# Patient Record
Sex: FEMALE | Race: WHITE | Employment: FULL TIME | URBAN - METROPOLITAN AREA
[De-identification: names, ages, dates, MRNs, and addresses within clinical notes are randomized per-mention and may not be internally consistent; named-entity substitution may affect disease eponyms.]

---

## 2017-02-07 ENCOUNTER — HOSPITAL ENCOUNTER (OUTPATIENT)
Dept: CARDIAC CATH/INVASIVE PROCEDURES | Age: 37
Discharge: HOME | End: 2017-02-07
Attending: INTERNAL MEDICINE | Admitting: INTERNAL MEDICINE
Payer: COMMERCIAL

## 2017-02-07 VITALS
DIASTOLIC BLOOD PRESSURE: 88 MMHG | OXYGEN SATURATION: 99 % | BODY MASS INDEX: 50.02 KG/M2 | WEIGHT: 293 LBS | HEART RATE: 128 BPM | TEMPERATURE: 97.7 F | SYSTOLIC BLOOD PRESSURE: 155 MMHG | HEIGHT: 64 IN | RESPIRATION RATE: 21 BRPM

## 2017-02-07 PROCEDURE — 6370000000 HC RX 637 (ALT 250 FOR IP): Performed by: INTERNAL MEDICINE

## 2017-02-07 PROCEDURE — 93620 COMP EP EVL R AT VEN PAC&REC: CPT | Performed by: INTERNAL MEDICINE

## 2017-02-07 PROCEDURE — 93623 PRGRMD STIMJ&PACG IV RX NFS: CPT | Performed by: INTERNAL MEDICINE

## 2017-02-07 PROCEDURE — C1768 GRAFT, VASCULAR: HCPCS

## 2017-02-07 PROCEDURE — C1730 CATH, EP, 19 OR FEW ELECT: HCPCS

## 2017-02-07 PROCEDURE — C1894 INTRO/SHEATH, NON-LASER: HCPCS

## 2017-02-07 PROCEDURE — 76937 US GUIDE VASCULAR ACCESS: CPT

## 2017-02-07 PROCEDURE — 93005 ELECTROCARDIOGRAM TRACING: CPT

## 2017-02-07 RX ORDER — ACETAMINOPHEN 325 MG/1
650 TABLET ORAL EVERY 4 HOURS PRN
Status: DISCONTINUED | OUTPATIENT
Start: 2017-02-07 | End: 2017-02-10 | Stop reason: HOSPADM

## 2017-02-07 RX ORDER — NEBIVOLOL 5 MG/1
5 TABLET ORAL DAILY
Qty: 60 TABLET | Refills: 3 | Status: SHIPPED | OUTPATIENT
Start: 2017-02-07

## 2017-02-07 RX ORDER — SODIUM CHLORIDE 9 MG/ML
INJECTION, SOLUTION INTRAVENOUS CONTINUOUS
Status: DISCONTINUED | OUTPATIENT
Start: 2017-02-07 | End: 2017-02-10 | Stop reason: HOSPADM

## 2017-02-07 RX ORDER — SODIUM CHLORIDE 0.9 % (FLUSH) 0.9 %
10 SYRINGE (ML) INJECTION EVERY 12 HOURS SCHEDULED
Status: DISCONTINUED | OUTPATIENT
Start: 2017-02-07 | End: 2017-02-10 | Stop reason: HOSPADM

## 2017-02-07 RX ORDER — SODIUM CHLORIDE 0.9 % (FLUSH) 0.9 %
10 SYRINGE (ML) INJECTION PRN
Status: DISCONTINUED | OUTPATIENT
Start: 2017-02-07 | End: 2017-02-10 | Stop reason: HOSPADM

## 2017-02-07 RX ADMIN — INSULIN LISPRO 10 UNITS: 100 INJECTION, SOLUTION INTRAVENOUS; SUBCUTANEOUS at 10:30

## 2017-02-10 LAB
EKG ATRIAL RATE: 90 BPM
EKG P AXIS: 56 DEGREES
EKG P-R INTERVAL: 152 MS
EKG Q-T INTERVAL: 394 MS
EKG QRS DURATION: 94 MS
EKG QTC CALCULATION (BAZETT): 481 MS
EKG R AXIS: 33 DEGREES
EKG T AXIS: 34 DEGREES
EKG VENTRICULAR RATE: 90 BPM

## 2018-02-15 ENCOUNTER — HOSPITAL ENCOUNTER (EMERGENCY)
Age: 38
Discharge: HOME | End: 2018-02-15
Attending: EMERGENCY MEDICINE
Payer: MEDICAID

## 2018-02-15 ENCOUNTER — APPOINTMENT (OUTPATIENT)
Dept: GENERAL RADIOLOGY | Age: 38
End: 2018-02-15
Payer: MEDICAID

## 2018-02-15 VITALS
SYSTOLIC BLOOD PRESSURE: 134 MMHG | TEMPERATURE: 98 F | RESPIRATION RATE: 18 BRPM | DIASTOLIC BLOOD PRESSURE: 99 MMHG | HEART RATE: 99 BPM | OXYGEN SATURATION: 95 %

## 2018-02-15 DIAGNOSIS — S83.92XA SPRAIN OF LEFT KNEE, UNSPECIFIED LIGAMENT, INITIAL ENCOUNTER: Primary | ICD-10-CM

## 2018-02-15 PROCEDURE — 99283 EMERGENCY DEPT VISIT LOW MDM: CPT

## 2018-02-15 PROCEDURE — 73564 X-RAY EXAM KNEE 4 OR MORE: CPT

## 2018-02-15 PROCEDURE — 6370000000 HC RX 637 (ALT 250 FOR IP): Performed by: EMERGENCY MEDICINE

## 2018-02-15 RX ORDER — IBUPROFEN 800 MG/1
800 TABLET ORAL ONCE
Status: COMPLETED | OUTPATIENT
Start: 2018-02-15 | End: 2018-02-15

## 2018-02-15 RX ORDER — IBUPROFEN 800 MG/1
800 TABLET ORAL EVERY 8 HOURS PRN
Qty: 30 TABLET | Refills: 0 | Status: SHIPPED | OUTPATIENT
Start: 2018-02-15

## 2018-02-15 RX ADMIN — IBUPROFEN 800 MG: 800 TABLET, FILM COATED ORAL at 13:07

## 2018-02-15 ASSESSMENT — PAIN DESCRIPTION - PAIN TYPE
TYPE: ACUTE PAIN
TYPE: ACUTE PAIN

## 2018-02-15 ASSESSMENT — PAIN DESCRIPTION - PROGRESSION
CLINICAL_PROGRESSION: GRADUALLY WORSENING
CLINICAL_PROGRESSION: GRADUALLY IMPROVING

## 2018-02-15 ASSESSMENT — PAIN DESCRIPTION - ORIENTATION
ORIENTATION: LEFT
ORIENTATION: LEFT

## 2018-02-15 ASSESSMENT — PAIN DESCRIPTION - LOCATION
LOCATION: KNEE
LOCATION: KNEE

## 2018-02-15 ASSESSMENT — ENCOUNTER SYMPTOMS
BACK PAIN: 0
NAUSEA: 0
WHEEZING: 0
SHORTNESS OF BREATH: 0
COUGH: 0
RHINORRHEA: 0
EYE PAIN: 0
EYE DISCHARGE: 0
DIARRHEA: 0
ABDOMINAL PAIN: 0
VOMITING: 0
SORE THROAT: 0

## 2018-02-15 ASSESSMENT — PAIN SCALES - GENERAL
PAINLEVEL_OUTOF10: 7
PAINLEVEL_OUTOF10: 8

## 2018-02-15 ASSESSMENT — PAIN DESCRIPTION - ONSET
ONSET: GRADUAL
ONSET: GRADUAL

## 2018-02-15 ASSESSMENT — PAIN DESCRIPTION - DESCRIPTORS
DESCRIPTORS: ACHING
DESCRIPTORS: ACHING

## 2018-02-15 ASSESSMENT — PAIN DESCRIPTION - FREQUENCY
FREQUENCY: CONTINUOUS
FREQUENCY: INTERMITTENT

## 2018-02-15 NOTE — ED NOTES
Pt presents to ED due to left knee pain. Pt states she was playing with her children on the floor and felt her left knee pop. Pt states the pain is a 8/10. Pt has retsed and elevated let knee and has been taking ibuprofen. There is minimal swelling in left knee. Vitals noted. Will continue to monitor.       Abdulaziz Villalobos RN  02/15/18 [id number]

## 2018-02-15 NOTE — ED PROVIDER NOTES
[facility]  eMERGENCY dEPARTMENT eNCOUnter          CHIEF COMPLAINT       Chief Complaint   Patient presents with    Knee Pain     left       Nurses Notes reviewed and I agree except as noted in the HPI. HISTORY OF PRESENT ILLNESS    Chetan Feliz is a 40 y.o. female who presents to the Emergency Department for the evaluation of left knee pain. The patient reports that she was wrestling with her son at approximately 8:30 AM this morning and she felt a pop in her left knee. Since this occurred, she has been unable to put much pressure on her left leg and is experiencing pain with flexion. She rates her pain as a 8/10 in severity. Patient denies any other pain or injury. Patient denies any fever, chills, nausea, vomiting, chest pain, or shortness of breath. The HPI was provided by the patient. REVIEW OF SYSTEMS     Review of Systems   Constitutional: Negative for appetite change, chills, fatigue and fever. HENT: Negative for congestion, ear pain, rhinorrhea and sore throat. Eyes: Negative for pain, discharge and visual disturbance. Respiratory: Negative for cough, shortness of breath and wheezing. Cardiovascular: Negative for chest pain, palpitations and leg swelling. Gastrointestinal: Negative for abdominal pain, diarrhea, nausea and vomiting. Genitourinary: Negative for difficulty urinating, dysuria and vaginal discharge. Musculoskeletal: Positive for arthralgias (left knee pain). Negative for back pain, joint swelling and neck pain. Skin: Negative for pallor and rash. Neurological: Negative for dizziness, syncope, weakness, light-headedness and headaches. Hematological: Negative for adenopathy. Psychiatric/Behavioral: Negative for confusion, dysphoric mood and suicidal ideas. The patient is not nervous/anxious. PAST MEDICAL HISTORY    has a past medical history of Diabetes mellitus ([facility] [address]  Hypertension; and SVT (supraventricular tachycardia)

## 2020-11-11 ENCOUNTER — HOSPITAL ENCOUNTER (OUTPATIENT)
Age: 40
Setting detail: SPECIMEN
Discharge: HOME | End: 2020-11-11
Payer: COMMERCIAL

## 2020-11-12 LAB
ALBUMIN SERPL-MCNC: 3.8 G/DL (ref 3.5–5.2)
ALBUMIN/GLOBULIN RATIO: 1.4 (ref 1–2.5)
ALP BLD-CCNC: 112 U/L (ref 35–104)
ALT SERPL-CCNC: 27 U/L (ref 5–33)
ANION GAP SERPL CALCULATED.3IONS-SCNC: 10 MMOL/L (ref 9–17)
AST SERPL-CCNC: 22 U/L
BILIRUB SERPL-MCNC: 0.33 MG/DL (ref 0.3–1.2)
BUN BLDV-MCNC: 10 MG/DL (ref 6–20)
BUN/CREAT BLD: ABNORMAL (ref 9–20)
CALCIUM SERPL-MCNC: 8.9 MG/DL (ref 8.6–10.4)
CHLORIDE BLD-SCNC: 101 MMOL/L (ref 98–107)
CHOLESTEROL/HDL RATIO: 10.5
CHOLESTEROL: 209 MG/DL
CO2: 25 MMOL/L (ref 20–31)
CREAT SERPL-MCNC: 0.6 MG/DL (ref 0.5–0.9)
GFR AFRICAN AMERICAN: >60 ML/MIN
GFR NON-AFRICAN AMERICAN: >60 ML/MIN
GFR SERPL CREATININE-BSD FRML MDRD: ABNORMAL ML/MIN/{1.73_M2}
GFR SERPL CREATININE-BSD FRML MDRD: ABNORMAL ML/MIN/{1.73_M2}
GLUCOSE BLD-MCNC: 395 MG/DL (ref 70–99)
HDLC SERPL-MCNC: 20 MG/DL
LDL CHOLESTEROL DIRECT: 61 MG/DL
LDL CHOLESTEROL: ABNORMAL MG/DL (ref 0–130)
POTASSIUM SERPL-SCNC: 3.8 MMOL/L (ref 3.7–5.3)
SODIUM BLD-SCNC: 136 MMOL/L (ref 135–144)
TOTAL PROTEIN: 6.5 G/DL (ref 6.4–8.3)
TRIGL SERPL-MCNC: 990 MG/DL
VLDLC SERPL CALC-MCNC: ABNORMAL MG/DL (ref 1–30)

## 2020-11-24 ENCOUNTER — HOSPITAL ENCOUNTER (OUTPATIENT)
Age: 40
Setting detail: SPECIMEN
Discharge: HOME | End: 2020-11-24
Payer: COMMERCIAL

## 2020-11-24 LAB
AMYLASE: 35 U/L (ref 28–100)
LIPASE: 24 U/L (ref 13–60)

## 2021-01-21 ENCOUNTER — HOSPITAL ENCOUNTER (OUTPATIENT)
Age: 41
Setting detail: SPECIMEN
Discharge: HOME | End: 2021-01-21
Payer: COMMERCIAL

## 2021-01-25 LAB
CHLAMYDIA BY THIN PREP: NEGATIVE
N. GONORRHOEAE DNA, THIN PREP: NEGATIVE
SPECIMEN DESCRIPTION: NORMAL

## 2021-01-26 LAB
HPV SAMPLE: NORMAL
HPV, GENOTYPE 16: NOT DETECTED
HPV, GENOTYPE 18: NOT DETECTED
HPV, HIGH RISK OTHER: NOT DETECTED
HPV, INTERPRETATION: NORMAL
SPECIMEN DESCRIPTION: NORMAL

## 2021-02-01 LAB — CYTOLOGY REPORT: NORMAL

## 2021-04-20 ENCOUNTER — HOSPITAL ENCOUNTER (OUTPATIENT)
Age: 41
Setting detail: SPECIMEN
Discharge: HOME | End: 2021-04-20
Payer: COMMERCIAL

## 2021-04-20 LAB
ALBUMIN SERPL-MCNC: 4.2 G/DL (ref 3.5–5.2)
ALBUMIN/GLOBULIN RATIO: 1.3 (ref 1–2.5)
ALP BLD-CCNC: 82 U/L (ref 35–104)
ALT SERPL-CCNC: 20 U/L (ref 5–33)
AMYLASE: 38 U/L (ref 28–100)
ANION GAP SERPL CALCULATED.3IONS-SCNC: 15 MMOL/L (ref 9–17)
AST SERPL-CCNC: 25 U/L
BILIRUB SERPL-MCNC: 0.53 MG/DL (ref 0.3–1.2)
BUN BLDV-MCNC: 8 MG/DL (ref 6–20)
BUN/CREAT BLD: ABNORMAL (ref 9–20)
CALCIUM SERPL-MCNC: 9.3 MG/DL (ref 8.6–10.4)
CHLORIDE BLD-SCNC: 106 MMOL/L (ref 98–107)
CHOLESTEROL, FASTING: 184 MG/DL
CHOLESTEROL/HDL RATIO: 5
CO2: 21 MMOL/L (ref 20–31)
CREAT SERPL-MCNC: 0.55 MG/DL (ref 0.5–0.9)
GFR AFRICAN AMERICAN: >60 ML/MIN
GFR NON-AFRICAN AMERICAN: >60 ML/MIN
GFR SERPL CREATININE-BSD FRML MDRD: ABNORMAL ML/MIN/{1.73_M2}
GFR SERPL CREATININE-BSD FRML MDRD: ABNORMAL ML/MIN/{1.73_M2}
GLUCOSE BLD-MCNC: 131 MG/DL (ref 70–99)
HDLC SERPL-MCNC: 37 MG/DL
LDL CHOLESTEROL: 118 MG/DL (ref 0–130)
LIPASE: 31 U/L (ref 13–60)
POTASSIUM SERPL-SCNC: 4.3 MMOL/L (ref 3.7–5.3)
SODIUM BLD-SCNC: 142 MMOL/L (ref 135–144)
TOTAL PROTEIN: 7.5 G/DL (ref 6.4–8.3)
TRIGLYCERIDE, FASTING: 145 MG/DL
VLDLC SERPL CALC-MCNC: ABNORMAL MG/DL (ref 1–30)

## 2021-05-11 ENCOUNTER — HOSPITAL ENCOUNTER (OUTPATIENT)
Dept: ULTRASOUND IMAGING | Age: 41
Discharge: HOME | End: 2021-05-13
Payer: COMMERCIAL

## 2021-05-11 DIAGNOSIS — R10.10 UPPER ABDOMINAL PAIN: ICD-10-CM

## 2021-05-11 PROCEDURE — 76705 ECHO EXAM OF ABDOMEN: CPT

## 2022-03-30 ENCOUNTER — HOSPITAL ENCOUNTER (OUTPATIENT)
Age: 42
Setting detail: SPECIMEN
Discharge: HOME | End: 2022-03-30

## 2022-03-31 LAB
-: ABNORMAL
AMORPHOUS: ABNORMAL
BACTERIA: ABNORMAL
BILIRUBIN URINE: NEGATIVE
COLOR: YELLOW
EPITHELIAL CELLS UA: ABNORMAL /HPF (ref 0–5)
GLUCOSE URINE: ABNORMAL
KETONES, URINE: NEGATIVE
LEUKOCYTE ESTERASE, URINE: NEGATIVE
MUCUS: ABNORMAL
NITRITE, URINE: NEGATIVE
PH UA: 6 (ref 5–8)
PROTEIN UA: ABNORMAL
RBC UA: ABNORMAL /HPF (ref 0–2)
SPECIFIC GRAVITY UA: 1.04 (ref 1–1.03)
TURBIDITY: ABNORMAL
URINE HGB: NEGATIVE
UROBILINOGEN, URINE: NORMAL
WBC UA: ABNORMAL /HPF (ref 0–5)